# Patient Record
(demographics unavailable — no encounter records)

---

## 2024-11-12 NOTE — HISTORY OF PRESENT ILLNESS
[FreeTextEntry1] : Agnes is a 62 F who is presenting for initial evaluation for colon cancer screening. She reports that she has overall been feeling well. She denies any issues with nausea, vomiting, abdominal pain, diarrhea, constipation. She has no family history of colon cancer. No prior colonoscopy in the past.

## 2024-11-12 NOTE — ASSESSMENT
[FreeTextEntry1] : 62 F presenting for evaluation for screening colonoscopy.   1. Screening for colon cancer: average risk, first colonoscopy -Schedule colonoscopy with suprep.    I, Teresita Tamayo, have explained the bowel prep, clear liquid diet 24 hours prior to procedure, risks, benefits, and alternatives of the procedures.  I have discussed the potential risks including, but not limited to perforation, bleeding, infection, cardiopulmonary complications, aspiration, or unforeseen complications